# Patient Record
Sex: FEMALE | Race: OTHER
[De-identification: names, ages, dates, MRNs, and addresses within clinical notes are randomized per-mention and may not be internally consistent; named-entity substitution may affect disease eponyms.]

---

## 2018-07-02 ENCOUNTER — HOSPITAL ENCOUNTER (EMERGENCY)
Dept: HOSPITAL 36 - ER | Age: 48
Discharge: HOME | End: 2018-07-02
Payer: COMMERCIAL

## 2018-07-02 DIAGNOSIS — N39.0: Primary | ICD-10-CM

## 2018-07-02 LAB
ALBUMIN SERPL-MCNC: 4.5 GM/DL (ref 3.7–5.3)
ALBUMIN/GLOB SERPL: 2 {RATIO} (ref 1–1.8)
ALP SERPL-CCNC: 57 U/L (ref 34–104)
ALT SERPL-CCNC: 11 U/L (ref 7–52)
ANION GAP SERPL CALC-SCNC: 11.2 MMOL/L (ref 7–16)
APPEARANCE UR: CLEAR
AST SERPL-CCNC: 13 U/L (ref 13–39)
BACTERIA #/AREA URNS HPF: (no result) /HPF
BASOPHILS # BLD AUTO: 0 TH/CUMM (ref 0–0.2)
BASOPHILS NFR BLD AUTO: 0.5 % (ref 0–2)
BILIRUB SERPL-MCNC: 0.3 MG/DL (ref 0.3–1)
BILIRUB UR-MCNC: NEGATIVE MG/DL
BUN SERPL-MCNC: 12 MG/DL (ref 7–25)
CALCIUM SERPL-MCNC: 9.7 MG/DL (ref 8.6–10.3)
CHLORIDE SERPL-SCNC: 103 MEQ/L (ref 98–107)
CO2 SERPL-SCNC: 24.4 MEQ/L (ref 21–31)
COLOR UR: (no result)
CREAT SERPL-MCNC: 0.5 MG/DL (ref 0.6–1.2)
EOSINOPHIL # BLD AUTO: 0 TH/CMM (ref 0.1–0.4)
EOSINOPHIL NFR BLD AUTO: 0.1 % (ref 0–5)
EPI CELLS URNS QL MICRO: (no result) /LPF
ERYTHROCYTE [DISTWIDTH] IN BLOOD BY AUTOMATED COUNT: 13.2 % (ref 11.5–20)
GLOBULIN SER-MCNC: 2.3 GM/DL
GLUCOSE SERPL-MCNC: 94 MG/DL (ref 70–105)
GLUCOSE UR STRIP-MCNC: NEGATIVE MG/DL
HCT VFR BLD CALC: 39 % (ref 41–60)
HGB BLD-MCNC: 13.2 GM/DL (ref 12–16)
KETONES UR STRIP-MCNC: NEGATIVE MG/DL
LEUKOCYTE ESTERASE UR-ACNC: (no result)
LYMPHOCYTE AB SER FC-ACNC: 0.9 TH/CMM (ref 1.5–3)
LYMPHOCYTES NFR BLD AUTO: 11.1 % (ref 20–50)
MCH RBC QN AUTO: 31 PG (ref 27–31)
MCHC RBC AUTO-ENTMCNC: 33.8 PG (ref 28–36)
MCV RBC AUTO: 91.7 FL (ref 81–100)
MICRO URNS: YES
MONOCYTES # BLD AUTO: 0 TH/CMM (ref 0.3–1)
MONOCYTES NFR BLD AUTO: 0.5 % (ref 2–10)
NEUTROPHILS # BLD: 7.2 TH/CMM (ref 1.8–8)
NEUTROPHILS NFR BLD AUTO: 87.8 % (ref 40–80)
NITRITE UR QL STRIP: NEGATIVE
PH UR STRIP: 5.5 [PH] (ref 4.6–8)
PLATELET # BLD: 164 TH/CMM (ref 150–400)
PMV BLD AUTO: 9.1 FL
POTASSIUM SERPL-SCNC: 3.6 MEQ/L (ref 3.5–5.1)
PROT UR STRIP-MCNC: NEGATIVE MG/DL
RBC # BLD AUTO: 4.25 MIL/CMM (ref 3.8–5.1)
RBC # UR STRIP: (no result) /UL
RBC #/AREA URNS HPF: (no result) /HPF (ref 0–5)
SODIUM SERPL-SCNC: 135 MEQ/L (ref 136–145)
SP GR UR STRIP: <= 1.005 (ref 1–1.03)
URINALYSIS COMPLETE PNL UR: (no result)
UROBILINOGEN UR STRIP-ACNC: 0.2 E.U./DL (ref 0.2–1)
WBC # BLD AUTO: 8.1 TH/CMM (ref 4.8–10.8)
WBC #/AREA URNS HPF: (no result) /HPF (ref 0–5)

## 2018-07-02 PROCEDURE — 81001 URINALYSIS AUTO W/SCOPE: CPT

## 2018-07-02 PROCEDURE — 99285 EMERGENCY DEPT VISIT HI MDM: CPT

## 2018-07-02 PROCEDURE — 71045 X-RAY EXAM CHEST 1 VIEW: CPT

## 2018-07-02 PROCEDURE — 93005 ELECTROCARDIOGRAM TRACING: CPT

## 2018-07-02 PROCEDURE — 81025 URINE PREGNANCY TEST: CPT

## 2018-07-02 PROCEDURE — 85025 COMPLETE CBC W/AUTO DIFF WBC: CPT

## 2018-07-02 PROCEDURE — 84484 ASSAY OF TROPONIN QUANT: CPT

## 2018-07-02 PROCEDURE — 87040 BLOOD CULTURE FOR BACTERIA: CPT

## 2018-07-02 PROCEDURE — 84443 ASSAY THYROID STIM HORMONE: CPT

## 2018-07-02 PROCEDURE — 80053 COMPREHEN METABOLIC PANEL: CPT

## 2018-07-02 PROCEDURE — 96372 THER/PROPH/DIAG INJ SC/IM: CPT

## 2018-07-02 PROCEDURE — 36415 COLL VENOUS BLD VENIPUNCTURE: CPT

## 2018-07-02 NOTE — ED PHYSICIAN CHART
ED Chief Complaint/HPI





- Patient Information


Date Seen:: 07/02/18


Time Seen:: 17:36


Chief Complaint:: LEFT FLANK PAIN


History of Present Illness:: 





THIS IS A 47 YO FEMALE WITH THE SUDDEN ONSET OF LEFT FLANK PAIN THAT RADIATES 

TO THE BACK,  SHE DENIES VOMITING AND PAINFUL URINATION.


SHE DENIES PREGNANCY


Allergies:: 


 Allergies











Allergy/AdvReac Type Severity Reaction Status Date / Time


 


No Known Allergies Allergy   Verified 07/02/18 17:33











Vitals:: 


 Vital Signs - 8 hr











  07/02/18





  17:41


 


Temp 99.9 F


 





 


RR 16


 


/73


 


O2 Sat % 99














ED Review of Systems





- Review of Systems


General/Constitutional: No fever, No chills, No weight loss, No weakness, No 

diaphoresis, No edema, No loss of appetite


Skin: No skin lesions, No rash, No bruising


Head: No headache, No light-headedness


Eyes: No loss of vision, No pain, No diplopia


ENT: No earache, No nasal drainage, No sore throat, No tinnitus


Neck: No neck pain, No swelling, No thyromegaly, No stiffness, No mass noted


Cardio Vascular: No chest pain, No palpitations, No PND, No orthopnea, No edema


Pulmonary: No SOB, No cough, No sputum, No wheezing


GI: No nausea, No vomiting, No diarrhea, No pain, No melena, No hematochezia, 

No constipation, No hematemesis


G/U: No dysuria, No frequency, No hematuria, Other (LEFT FLANK PAIN)


Musculoskeletal: No bone or joint pain, No back pain, No muscle pain


Endocrine: No polyuria, No polydipsia


Psychiatric: No prior psych history, No depression, No anxiety, No suicidal 

ideation


Hematopoietic: No bruising, No lymphadenopathy


Allergic/Immuno: No urticaria, No angioedema


Neurological: No syncope, No focal symptoms, No weakness, No paresthesia, No 

headache, No seizure, No dizziness, No confusion, No vertigo





ED Past Medical History





- Past Medical History


Obtainable: Yes


Past Medical History: Thyroid disorder, Other (CARPEL TUNNEL DISEASE FOT HE 

RIGHT HAND)


Social History: Other (C-SPINE SURGERY)





Family Medical History





- Family Member


  ** Mother


History Unknown: Yes





ED Physical Exam





- Physical Examination


General/Constitutional: Awake, Well-developed, well-nourished, Alert, No 

distress, GCS 15, Non-toxic appearing, Ambulatory


Head: Atraumatic


Eyes: Lids, conjuctiva normal, PERRL, EOMI


Skin: Nl inspection, No rash, No skin lesions, No ecchymosis, Well hydrated, No 

lymphadenopathy


ENMT: External ears, nose nl, Nasal exam nl, Lips, teeth, gums nl


Neck: Nontender, Full ROM w/o pain, No JVD, No nuchal rigidity, No bruit, No 

mass, No stridor


Respiratory: Nl effort/Exclusion, Clear to Auscultation, No Wheeze/Rhonchi/Rales


Cardio Vascular: RRR, No murmur, gallop, rubs, NL S1 S2


GI: No tenderness/rebounding/guarding, No organomegaly, No hernia, Normal BS's, 

Nondistended, No mass/bruits, No McBurney tenderness


: No CVA tenderness


Extremities: No tenderness or effusion, Full ROM, normal strength in all 

extremities, No edema, Normal digits & nails


Neuro/Psych: Alert/oriented, DTR's symmetric, Normal sensory exam, Normal motor 

strength, Judgement/insight normal, Mood normal, Normal gait, No focal deficits


Misc: Normal back, No paraspinal tenderness





ED Labs/Radiology/EKG Results





- Lab Results


Results: 


 Laboratory Tests











  07/02/18





  17:19


 


WBC  8.1


 


RBC  4.25


 


Hgb  13.2


 


Hct  39.0 L


 


MCV  91.7


 


MCH  31.0


 


MCHC Differential  33.8


 


RDW  13.2


 


Plt Count  164


 


MPV  9.1


 


Neutrophils %  87.8 H


 


Lymphocytes %  11.1 L


 


Monocytes %  0.5 L


 


Eosinophils %  0.1


 


Basophils %  0.5














ED Assessment





- Assessment


General Assessment: 





URINARY TRACT INFECTION





ED Septic Shock





- .


Is Septic Shock (SBP<90, OR Lactate>4 mmol\L) present?: No





- <6hrs of presentation:


Vital Signs: 


 Vital Signs - 8 hr











  07/02/18





  17:41


 


Temp 99.9 F


 





 


RR 16


 


/73


 


O2 Sat % 99














ED Reassessment (Disposition)





- Reassessment


Reassessment Condition:: Unchanged





- Diagnosis


Diagnosis:: 





URINARY TRACT INFECTION





- Aftercare/Follow up Instructions


Aftercare/Follow-Up Instructions:: Counseled pt regarding lab results/diagnosis 

& need follow up, Refer to Discharge Instructions, Counseled pt & family 

regarding lab results/diagnosis & need follow up





- Patient Disposition


Discharge/Transfer:: Home


Condition at Disposition:: Improved





ED Discharge Plan





- Patient Disposition


Admit/Discharge/Transfer: PT DISCHARGED HOME


Condition at Disposition: Improved

## 2018-07-03 NOTE — DIAGNOSTIC IMAGING REPORT
CHEST X-RAY: AP view



INDICATION: pain



COMPARISON: None



FINDINGS: There is no focal consolidation or pleural effusions The heart

is normal in size. The osseous structures demonstrate no acute

abnormalities.  Gas-filled loops of bowel of the left upper quadrant are

noted.



IMPRESSION: 



No acute cardiopulmonary disease.